# Patient Record
Sex: FEMALE | Race: WHITE | HISPANIC OR LATINO | URBAN - METROPOLITAN AREA
[De-identification: names, ages, dates, MRNs, and addresses within clinical notes are randomized per-mention and may not be internally consistent; named-entity substitution may affect disease eponyms.]

---

## 2017-07-19 ENCOUNTER — IMPORTED ENCOUNTER (OUTPATIENT)
Dept: URBAN - METROPOLITAN AREA CLINIC 50 | Facility: CLINIC | Age: 42
End: 2017-07-19

## 2017-07-28 ENCOUNTER — IMPORTED ENCOUNTER (OUTPATIENT)
Dept: URBAN - METROPOLITAN AREA CLINIC 50 | Facility: CLINIC | Age: 42
End: 2017-07-28

## 2017-08-30 ENCOUNTER — IMPORTED ENCOUNTER (OUTPATIENT)
Dept: URBAN - METROPOLITAN AREA CLINIC 50 | Facility: CLINIC | Age: 42
End: 2017-08-30

## 2018-02-28 ENCOUNTER — IMPORTED ENCOUNTER (OUTPATIENT)
Dept: URBAN - METROPOLITAN AREA CLINIC 50 | Facility: CLINIC | Age: 43
End: 2018-02-28

## 2018-06-20 ENCOUNTER — IMPORTED ENCOUNTER (OUTPATIENT)
Dept: URBAN - METROPOLITAN AREA CLINIC 50 | Facility: CLINIC | Age: 43
End: 2018-06-20

## 2019-06-06 ENCOUNTER — IMPORTED ENCOUNTER (OUTPATIENT)
Dept: URBAN - METROPOLITAN AREA CLINIC 50 | Facility: CLINIC | Age: 44
End: 2019-06-06

## 2019-06-19 ENCOUNTER — IMPORTED ENCOUNTER (OUTPATIENT)
Dept: URBAN - METROPOLITAN AREA CLINIC 50 | Facility: CLINIC | Age: 44
End: 2019-06-19

## 2019-12-18 ENCOUNTER — IMPORTED ENCOUNTER (OUTPATIENT)
Dept: URBAN - METROPOLITAN AREA CLINIC 50 | Facility: CLINIC | Age: 44
End: 2019-12-18

## 2021-04-28 NOTE — PATIENT DISCUSSION
4/28/21: The patient has developed an ACUTE PVD. No holes or tears were seen on clinical exam today. Reviewed the signs and symptoms of retinal tear/retinal detachment and the importance of calling for prompt evaluation should there be increasing floaters, new flashing lights, or decreasing peripheral vision in either eye at any time. Observation recommended.

## 2021-04-28 NOTE — PATIENT DISCUSSION
Reviewed risks and benefits with patient of AREDS formulation for preventing progression of AMD. Also, reviewed pros and cons of Genotype based vitamin therapy for AMD. Patient instructed on the use of the [address] and asked to check their vision daily and to return for evaluation no later than 72 hours after any new changes. Patient instructed to avoid smoking, eat a healthy diet, exercise regularly if approved by their internist, monitor their weight and BMI and try and keep it in a normal range. For patients not on Coumadin eating a diet rich in green leafy vegtables is recommended.

## 2021-05-14 ASSESSMENT — VISUAL ACUITY
OS_CC: J1+
OD_CC: J1+@ 16 IN
OD_CC: 20/20
OD_CC: J1+
OD_SC: 20/30
OS_SC: 20/20
OS_CC: 20/20
OD_PH: 20/20-1
OD_SC: 20/20-
OD_SC: 20/20-3
OS_SC: 20/20
OD_SC: 20/20-
OD_SC: 20/20
OS_SC: 20/20
OS_CC: J1+@ 16 IN
OS_SC: 20/20
OS_SC: 20/20

## 2021-05-14 ASSESSMENT — PACHYMETRY
OS_CT_UM: 456
OD_CT_UM: 537
OS_CT_UM: 456
OD_CT_UM: 537
OS_CT_UM: 456
OD_CT_UM: 537

## 2021-05-14 ASSESSMENT — TONOMETRY
OS_IOP_MMHG: 14
OS_IOP_MMHG: 12
OS_IOP_MMHG: 11
OD_IOP_MMHG: 12
OS_IOP_MMHG: 10
OD_IOP_MMHG: 12
OS_IOP_MMHG: 16
OD_IOP_MMHG: 11
OS_IOP_MMHG: 10
OS_IOP_MMHG: 13
OD_IOP_MMHG: 13
OD_IOP_MMHG: 14
OS_IOP_MMHG: 15
OS_IOP_MMHG: 14

## 2021-06-16 ENCOUNTER — PREPPED CHART (OUTPATIENT)
Dept: URBAN - METROPOLITAN AREA CLINIC 50 | Facility: CLINIC | Age: 46
End: 2021-06-16

## 2021-06-16 ASSESSMENT — VISUAL ACUITY
OD_SC: 20/30
OD_PH: 20/20-1
OS_SC: 20/20

## 2021-06-16 ASSESSMENT — TONOMETRY
OD_IOP_MMHG: 13
OS_IOP_MMHG: 16

## 2021-06-16 ASSESSMENT — KERATOMETRY
OS_K2POWER_DIOPTERS: 45.75
OS_AXISANGLE2_DEGREES: 180
OS_AXISANGLE_DEGREES: 090
OS_K1POWER_DIOPTERS: 45.75
OD_K1POWER_DIOPTERS: 45.50
OD_AXISANGLE2_DEGREES: 180
OD_AXISANGLE_DEGREES: 090
OD_K2POWER_DIOPTERS: 45.50

## 2021-06-18 ENCOUNTER — ROUTINE EXAM (OUTPATIENT)
Dept: URBAN - METROPOLITAN AREA CLINIC 50 | Facility: CLINIC | Age: 46
End: 2021-06-18

## 2021-06-18 DIAGNOSIS — Z01.01: ICD-10-CM

## 2021-06-18 PROCEDURE — 92015 DETERMINE REFRACTIVE STATE: CPT

## 2021-06-18 PROCEDURE — 92014 COMPRE OPH EXAM EST PT 1/>: CPT

## 2021-06-18 ASSESSMENT — TONOMETRY
OD_IOP_MMHG: 17
OS_IOP_MMHG: 18
OD_IOP_MMHG: 18
OS_IOP_MMHG: 17

## 2021-06-18 ASSESSMENT — VISUAL ACUITY
OU_CC: J1+
OD_SC: 20/40-
OS_CC: J2
OD_PH: 20/30
OS_SC: 20/25-
OD_CC: J1
OS_PH: 20/20-2

## 2021-06-18 ASSESSMENT — PACHYMETRY
OD_CT_UM: 553
OS_CT_UM: 556

## 2021-06-18 NOTE — PATIENT DISCUSSION
Patient expressed that she would like to try contact lenses.  possible multifocal for all distances.

## 2021-06-18 NOTE — PATIENT DISCUSSION
Patient has large optic nerves.  hvf/erg done in 6/2019 with normal results.  no need for any drop therapy or additional testing.

## 2022-09-02 ENCOUNTER — ESTABLISHED PATIENT (OUTPATIENT)
Dept: URBAN - METROPOLITAN AREA CLINIC 50 | Facility: CLINIC | Age: 47
End: 2022-09-02

## 2022-09-02 DIAGNOSIS — H25.13: ICD-10-CM

## 2022-09-02 DIAGNOSIS — H40.013: ICD-10-CM

## 2022-09-02 DIAGNOSIS — Z01.01: ICD-10-CM

## 2022-09-02 PROCEDURE — 92014 COMPRE OPH EXAM EST PT 1/>: CPT

## 2022-09-02 PROCEDURE — 92015 DETERMINE REFRACTIVE STATE: CPT

## 2022-09-02 ASSESSMENT — VISUAL ACUITY
OS_CC: 20/25
OS_GLARE: 20/20
OS_GLARE: 20/20
OU_CC: J1@16"
OD_CC: 20/25-1
OU_CC: 20/25+2
OD_GLARE: 20/30
OD_GLARE: 20/25

## 2022-09-02 ASSESSMENT — TONOMETRY
OS_IOP_MMHG: 14
OD_IOP_MMHG: 16
OD_IOP_MMHG: 15
OS_IOP_MMHG: 13

## 2022-09-02 ASSESSMENT — KERATOMETRY
OD_K1POWER_DIOPTERS: 45.50
OD_K2POWER_DIOPTERS: 45.75
OS_AXISANGLE2_DEGREES: 90
OD_AXISANGLE_DEGREES: 084
OS_AXISANGLE_DEGREES: 180
OD_AXISANGLE2_DEGREES: 174
OS_K1POWER_DIOPTERS: 45.75
OS_K2POWER_DIOPTERS: 45.75

## 2022-09-02 NOTE — PATIENT DISCUSSION
Good ocular health on dilated exam today. Eyeglass prescription given. Patient instructed to call office immediately if sudden changes in vision occur. Emphasized importance of sunglasses and healthy lifestyle.
Increased C:D ratio. The IOP is in the target range. Will schedule HVF/RNFL OCT next available.
MRX given.
NOT VISUALLY SIGNIFICANT: Informed patient that their cataract is not visually significant or does not meet the criteria for cataract surgery. Recommend attention to cataract symptoms monitoring by regular examinations. Patient instructed to call with changes in vision.
Patient expressed that she would like to try contact lenses.  possible multifocal for all distances.
Patient has large optic nerves.  hvf/erg done in 6/2019 with normal results.  no need for any drop therapy or additional testing.
35.8

## 2022-10-13 ENCOUNTER — DIAGNOSTICS ONLY (OUTPATIENT)
Dept: URBAN - METROPOLITAN AREA CLINIC 50 | Facility: CLINIC | Age: 47
End: 2022-10-13

## 2022-10-13 DIAGNOSIS — H40.013: ICD-10-CM

## 2022-10-13 PROCEDURE — 92133 CPTRZD OPH DX IMG PST SGM ON: CPT

## 2022-10-13 PROCEDURE — 92083 EXTENDED VISUAL FIELD XM: CPT

## 2022-10-13 ASSESSMENT — KERATOMETRY
OS_K1POWER_DIOPTERS: 45.75
OD_AXISANGLE_DEGREES: 084
OD_K1POWER_DIOPTERS: 45.50
OS_K2POWER_DIOPTERS: 45.75
OS_AXISANGLE2_DEGREES: 90
OD_K2POWER_DIOPTERS: 45.75
OD_AXISANGLE2_DEGREES: 174
OS_AXISANGLE_DEGREES: 180

## 2023-09-01 ENCOUNTER — COMPREHENSIVE EXAM (OUTPATIENT)
Dept: URBAN - METROPOLITAN AREA CLINIC 50 | Facility: LOCATION | Age: 48
End: 2023-09-01

## 2023-09-01 DIAGNOSIS — H25.13: ICD-10-CM

## 2023-09-01 DIAGNOSIS — Z01.01: ICD-10-CM

## 2023-09-01 DIAGNOSIS — H40.013: ICD-10-CM

## 2023-09-01 PROCEDURE — 92014 COMPRE OPH EXAM EST PT 1/>: CPT

## 2023-09-01 PROCEDURE — 92015 DETERMINE REFRACTIVE STATE: CPT

## 2023-09-01 ASSESSMENT — KERATOMETRY
OD_K2POWER_DIOPTERS: 45.75
OS_K1POWER_DIOPTERS: 45.75
OS_AXISANGLE2_DEGREES: 90
OD_AXISANGLE_DEGREES: 084
OS_K2POWER_DIOPTERS: 45.75
OD_AXISANGLE2_DEGREES: 174
OD_K1POWER_DIOPTERS: 45.50
OS_AXISANGLE_DEGREES: 180

## 2023-09-01 ASSESSMENT — VISUAL ACUITY
OU_CC: J1+
OD_GLARE: 20/25
OS_CC: 20/20
OD_CC: 20/20-1
OS_GLARE: 20/25

## 2023-09-01 ASSESSMENT — TONOMETRY
OD_IOP_MMHG: 13
OD_IOP_MMHG: 12
OS_IOP_MMHG: 12
OS_IOP_MMHG: 11

## 2023-12-01 ENCOUNTER — DIAGNOSTICS ONLY (OUTPATIENT)
Dept: URBAN - METROPOLITAN AREA CLINIC 50 | Facility: LOCATION | Age: 48
End: 2023-12-01

## 2023-12-01 DIAGNOSIS — H40.013: ICD-10-CM

## 2023-12-01 PROCEDURE — 92133 CPTRZD OPH DX IMG PST SGM ON: CPT

## 2023-12-01 PROCEDURE — 92083 EXTENDED VISUAL FIELD XM: CPT

## 2023-12-01 ASSESSMENT — KERATOMETRY
OS_K2POWER_DIOPTERS: 45.75
OD_K2POWER_DIOPTERS: 45.75
OS_K1POWER_DIOPTERS: 45.75
OD_AXISANGLE_DEGREES: 084
OS_AXISANGLE_DEGREES: 180
OS_AXISANGLE2_DEGREES: 90
OD_AXISANGLE2_DEGREES: 174
OD_K1POWER_DIOPTERS: 45.50

## 2024-09-06 ENCOUNTER — COMPREHENSIVE EXAM (OUTPATIENT)
Dept: URBAN - METROPOLITAN AREA CLINIC 50 | Facility: LOCATION | Age: 49
End: 2024-09-06

## 2024-09-06 DIAGNOSIS — Z01.01: ICD-10-CM

## 2024-09-06 DIAGNOSIS — H04.123: ICD-10-CM

## 2024-09-06 DIAGNOSIS — H25.13: ICD-10-CM

## 2024-09-06 DIAGNOSIS — H40.013: ICD-10-CM

## 2024-09-06 DIAGNOSIS — H52.4: ICD-10-CM

## 2024-09-06 PROCEDURE — 92014 COMPRE OPH EXAM EST PT 1/>: CPT

## 2024-09-06 PROCEDURE — 92015 DETERMINE REFRACTIVE STATE: CPT

## 2024-12-12 ENCOUNTER — DIAGNOSTICS ONLY (OUTPATIENT)
Age: 49
End: 2024-12-12

## 2024-12-12 DIAGNOSIS — H40.013: ICD-10-CM

## 2024-12-12 PROCEDURE — 92083 EXTENDED VISUAL FIELD XM: CPT

## 2024-12-12 PROCEDURE — 92133 CPTRZD OPH DX IMG PST SGM ON: CPT
